# Patient Record
Sex: MALE | Race: WHITE
[De-identification: names, ages, dates, MRNs, and addresses within clinical notes are randomized per-mention and may not be internally consistent; named-entity substitution may affect disease eponyms.]

---

## 2022-05-31 LAB
BUN BLD-MCNC: 24 MG/DL (ref 7–18)
GLUCOSE SERPLBLD-MCNC: 88 MG/DL (ref 74–106)
HCT VFR BLD CALC: 49.1 % (ref 39.6–49)
LYMPHOCYTES # SPEC AUTO: 2.1 K/UL (ref 0.7–4.9)
PMV BLD: 7.7 FL (ref 7.6–11.3)
POTASSIUM SERPL-SCNC: 4 MMOL/L (ref 3.5–5.1)
RBC # BLD: 5.87 M/UL (ref 4.33–5.43)

## 2022-06-03 ENCOUNTER — HOSPITAL ENCOUNTER (OUTPATIENT)
Dept: HOSPITAL 97 - OR | Age: 52
Discharge: HOME | End: 2022-06-03
Attending: SURGERY
Payer: COMMERCIAL

## 2022-06-03 VITALS — SYSTOLIC BLOOD PRESSURE: 103 MMHG | OXYGEN SATURATION: 96 % | TEMPERATURE: 97.7 F | DIASTOLIC BLOOD PRESSURE: 50 MMHG

## 2022-06-03 DIAGNOSIS — Z20.822: ICD-10-CM

## 2022-06-03 DIAGNOSIS — K42.0: Primary | ICD-10-CM

## 2022-06-03 PROCEDURE — 88302 TISSUE EXAM BY PATHOLOGIST: CPT

## 2022-06-03 PROCEDURE — 49653: CPT

## 2022-06-03 PROCEDURE — 85025 COMPLETE CBC W/AUTO DIFF WBC: CPT

## 2022-06-03 PROCEDURE — 36415 COLL VENOUS BLD VENIPUNCTURE: CPT

## 2022-06-03 PROCEDURE — 80048 BASIC METABOLIC PNL TOTAL CA: CPT

## 2022-06-03 PROCEDURE — 0WUF4JZ SUPPLEMENT ABDOMINAL WALL WITH SYNTHETIC SUBSTITUTE, PERCUTANEOUS ENDOSCOPIC APPROACH: ICD-10-PCS

## 2022-06-03 RX ADMIN — HYDROMORPHONE HYDROCHLORIDE ONE MG: 1 INJECTION, SOLUTION INTRAMUSCULAR; INTRAVENOUS; SUBCUTANEOUS at 10:16

## 2022-06-03 RX ADMIN — BUPIVACAINE HYDROCHLORIDE ONE ML: 2.5 INJECTION, SOLUTION EPIDURAL; INFILTRATION; INTRACAUDAL at 08:46

## 2022-06-03 RX ADMIN — HYDROMORPHONE HYDROCHLORIDE ONE MG: 1 INJECTION, SOLUTION INTRAMUSCULAR; INTRAVENOUS; SUBCUTANEOUS at 10:17

## 2022-06-03 RX ADMIN — HYDROMORPHONE HYDROCHLORIDE ONE MG: 1 INJECTION, SOLUTION INTRAMUSCULAR; INTRAVENOUS; SUBCUTANEOUS at 10:08

## 2022-06-03 RX ADMIN — BUPIVACAINE HYDROCHLORIDE ONE ML: 2.5 INJECTION, SOLUTION EPIDURAL; INFILTRATION; INTRACAUDAL at 08:37

## 2022-06-03 RX ADMIN — HYDROMORPHONE HYDROCHLORIDE ONE MG: 1 INJECTION, SOLUTION INTRAMUSCULAR; INTRAVENOUS; SUBCUTANEOUS at 10:00

## 2022-06-03 RX ADMIN — HYDROMORPHONE HYDROCHLORIDE ONE MG: 1 INJECTION, SOLUTION INTRAMUSCULAR; INTRAVENOUS; SUBCUTANEOUS at 09:40

## 2022-06-03 RX ADMIN — HYDROMORPHONE HYDROCHLORIDE ONE MG: 1 INJECTION, SOLUTION INTRAMUSCULAR; INTRAVENOUS; SUBCUTANEOUS at 09:50

## 2022-06-03 RX ADMIN — HYDROMORPHONE HYDROCHLORIDE ONE MG: 1 INJECTION, SOLUTION INTRAMUSCULAR; INTRAVENOUS; SUBCUTANEOUS at 09:45

## 2022-06-03 NOTE — OP
Date of Procedure:  06/03/2022



Surgeon:  Sinan Acuña MD, MD



Preoperative Diagnosis:  Incarcerated ventral umbilical hernia.



Postoperative Diagnosis:  Incarcerated ventral umbilical hernia.



Procedure Performed:  Laparoscopic ventral umbilical hernia repair with mesh.



Anesthesia:  General endotracheal plus local with 0.25% Marcaine.



Estimated Blood Loss:  Less than 5 cc.



Specimens:  Hernia contents.



Findings:  Incarcerated umbilical hernia with skin changes.



Complications:  None.



Implants:  Approximately 11.4 cm Bard Ventralight round mesh and the SorbaFix absorbable fixation tac
ker.



Disposition:  Patient was transferred to recovery room in good condition.



Procedure In Detail:  After informed consent was obtained, patient was brought to the operating room 
and prepped and draped in the usual sterile fashion.  After adequate anesthesia was achieved, an area
 of the left upper quadrant was anesthetized with 0.25% Marcaine and sharply incised.  A 5 mm 0-degre
e optical trocar was introduced in the abdomen without evidence of any complication.  Insufflation wa
s obtained to 15 mmHg at this time.  There was no injury to vital structures upon entry into the abdo
men.  Additional trocar was chosen at left lower quadrant.  This area was similarly anesthetized and 
sharply incised.  A 12 mm trocar was placed under direct visualization without any evidence of compli
cation.  I then used a ratcheted graspers to grasp patient's incarcerated umbilical hernia and reduce
d it to the normal peritoneal position.  Omentum was entrapped within as well as preperitoneal fat.  
After using the LigaSure device to clear back the preperitoneal fat to allow for appropriate landing 
zone, additional hernia was appreciated.  The infraumbilical position was approximately half a centim
eter in size inferior to the umbilical hernia, which was found to be approximately 2 cm defect.  At t
his point, I used the Endo stitch with a V-Loc suture and ran a baseball stitch to close the hernia d
efect while imbricating the hernia sac in a running fashion as described with good apposition of the 
tissues.  I then sized an 11.4 cm Bard Ventralight ST mesh with Echo Positioning System positioned at
 the central portion with a stab incision and deployed the balloon at this point after being complete
ly positioned.  At this point, I used the SorbaFix absorbable fixation Tacker to secure the mesh to t
he anterior abdominal wall with a single crown, removed the mesh deployment system and then secured a
 second crown of approximately 45 to 50 tacks to the anterior abdominal wall with good apposition of 
tissues.  At this point, the mesh was in good apposition.  The hernia was completely repaired.  No bl
eeding or hemostatic maneuvers were required.  The patient was positioned slightly away.  The 12 mm t
rocar site was closed using a Carson-Wade suture passer with 0 Vicryl in an interrupted fashion w
ith good approximation of tissues.  The abdomen was completely desufflated under direct visualization
 without any complication.  Remaining skin incisions were then copiously irrigated after the abdomen 
was completely desufflated.  All skin incisions were then irrigated, closed with a 4-0 Monocryl in a 
running fashion.  Dermabond was placed over top.  The patient tolerated the procedure well without an
y evidence of complication and transferred to PACU in good condition.  All counts were correct at the
 end of the case.





MARIELLA/TIFFANY

DD:  06/03/2022 09:34:19Voice ID:  [id number]

DT:  06/03/2022 20:42:42Report ID:  [id number]

## 2022-06-03 NOTE — P.OP
Preoperative diagnosis: Incarcerated Ventral Umbilical Hernia


Postoperative diagnosis: Incarcerated Ventral Umbilical Hernia


Primary procedure: Laparoscopic Ventral Hernia Repair with Mesh


Anesthesia: GETA + Local


Estimated blood loss: <5cc


Specimen: hernia contents


Findings: incarcerated umbilical hernia with skin changes


Complications: None


Implants: 11.4cm bard ventralite round mesh, sorbafix x 2


Transferred to: Recovery Room


Condition: Good

## 2022-07-11 ENCOUNTER — HOSPITAL ENCOUNTER (EMERGENCY)
Dept: HOSPITAL 97 - ER | Age: 52
Discharge: HOME | End: 2022-07-11
Payer: COMMERCIAL

## 2022-07-11 VITALS — OXYGEN SATURATION: 99 % | DIASTOLIC BLOOD PRESSURE: 78 MMHG | TEMPERATURE: 98 F | SYSTOLIC BLOOD PRESSURE: 126 MMHG

## 2022-07-11 DIAGNOSIS — R19.7: ICD-10-CM

## 2022-07-11 DIAGNOSIS — R11.2: ICD-10-CM

## 2022-07-11 DIAGNOSIS — R10.9: Primary | ICD-10-CM

## 2022-07-11 LAB
ALBUMIN SERPL BCP-MCNC: 3.7 G/DL (ref 3.4–5)
ALP SERPL-CCNC: 86 U/L (ref 45–117)
ALT SERPL W P-5'-P-CCNC: 67 U/L (ref 12–78)
AST SERPL W P-5'-P-CCNC: 25 U/L (ref 15–37)
BUN BLD-MCNC: 23 MG/DL (ref 7–18)
GLUCOSE SERPLBLD-MCNC: 91 MG/DL (ref 74–106)
HCT VFR BLD CALC: 48.5 % (ref 39.6–49)
LIPASE SERPL-CCNC: 84 U/L (ref 73–393)
LYMPHOCYTES # SPEC AUTO: 1.7 K/UL (ref 0.7–4.9)
MCV RBC: 81.7 FL (ref 80–100)
PMV BLD: 7.8 FL (ref 7.6–11.3)
POTASSIUM SERPL-SCNC: 3.9 MMOL/L (ref 3.5–5.1)
RBC # BLD: 5.94 M/UL (ref 4.33–5.43)

## 2022-07-11 PROCEDURE — 36415 COLL VENOUS BLD VENIPUNCTURE: CPT

## 2022-07-11 PROCEDURE — 74177 CT ABD & PELVIS W/CONTRAST: CPT

## 2022-07-11 PROCEDURE — 96360 HYDRATION IV INFUSION INIT: CPT

## 2022-07-11 PROCEDURE — 99284 EMERGENCY DEPT VISIT MOD MDM: CPT

## 2022-07-11 PROCEDURE — 81003 URINALYSIS AUTO W/O SCOPE: CPT

## 2022-07-11 PROCEDURE — 85025 COMPLETE CBC W/AUTO DIFF WBC: CPT

## 2022-07-11 PROCEDURE — 83690 ASSAY OF LIPASE: CPT

## 2022-07-11 PROCEDURE — 80053 COMPREHEN METABOLIC PANEL: CPT

## 2022-07-11 NOTE — EDPHYS
Physician Documentation                                                                           

 [facility]                                                                 

Name: Ángel Zimmer                                                                         

Age: 51 yrs                                                                                       

Sex: Male                                                                                         

: 1970                                                                                   

MRN: [id number]                                                                                   

Arrival Date: 2022                                                                          

Time: 10:41                                                                                       

Account#: [id number]                                                                            

Bed 20                                                                                            

Private MD: Yevgeniy Nath                                                                         

ED Physician Anuj Cespedes                                                                         

HPI:                                                                                              

                                                                                             

11:21 This 51 yrs old Male presents to ER via Ambulatory with complaints of Abdominal Pain,   jmm 

      Vomiting/Diarrhea.                                                                          

11:21 The patient presents with abdominal pain. Onset: The symptoms/episode began/occurred    jmm 

      acutely, 1 day(s) ago. The symptoms do not radiate. Associated signs and symptoms:          

      Pertinent positives: nausea and vomiting, diarrhea. The symptoms are described as achy,     

      crampy. Modifying factors: The symptoms are alleviated by nothing, the symptoms are         

      aggravated by nothing. It is unknown whether or not the patient has had similar             

      symptoms in the past.                                                                       

                                                                                                  

Historical:                                                                                       

- Allergies:                                                                                      

10:57 No Known Allergies;                                                                     ap3 

- Home Meds:                                                                                      

10:57 Flomax Oral [Active]; Nexium Oral [Active];                                             ap3 

- PMHx:                                                                                           

10:57 enlarged prostate;                                                                      ap3 

                                                                                                  

- Immunization history:: Client reports receiving the 2nd dose of the Covid vaccine.              

- Social history:: Smoking status: Patient denies any tobacco usage or history of.                

                                                                                                  

                                                                                                  

ROS:                                                                                              

11:21 Cardiovascular: Negative for chest pain, palpitations, and edema, Respiratory: Negative jmm 

      for shortness of breath, cough, wheezing, and pleuritic chest pain.                         

11:21 Constitutional: Positive for body aches.                                                    

11:21 Abdomen/GI: Positive for abdominal pain, nausea and vomiting, diarrhea.                     

11:21 All other systems are negative.                                                             

                                                                                                  

Exam:                                                                                             

11:21 Head/Face:  atraumatic. Eyes:  EOMI, no conjunctival erythema appreciated ENT:  Moist   jmm 

      Mucus Membranes Neck:  Trachea midline, Supple Chest/axilla:  Normal chest wall             

      appearance and motion.   Cardiovascular:  Regular rate and rhythm.  No edema                

      appreciated Respiratory:  Normal respirations, no respiratory distress appreciated          

11:21 Back:  Normal ROM Skin:  General appearance color normal MS/ Extremity:  Moves all          

      extremities, no obvious deformities appreciated, no edema noted to the lower                

      extremities  Neuro:  Awake and alert Psych:  Behavior is normal, Mood is normal,            

      Patient is cooperative and pleasant                                                         

11:21 Constitutional: The patient appears in no acute distress, alert, awake.                     

11:21 Abdomen/GI: Inspection: abdomen appears normal, Bowel sounds: normal, Palpation: soft,      

      mild abdominal tenderness, in all quadrants.                                                

                                                                                                  

Vital Signs:                                                                                      

10:55  / 89; Pulse 92; Resp 17; Temp 98.1; Pulse Ox 96% ; Weight 93.44 kg; Height 6 ft. ap3 

      0 in. (182.88 cm);                                                                          

15:07  / 78; Pulse 86; Resp 18; Temp 98.0; Pulse Ox 99% on R/A;                         ph  

10:55 Body Mass Index 27.94 (93.44 kg, 182.88 cm)                                             ap3 

                                                                                                  

MDM:                                                                                              

11:21 Patient medically screened.                                                             [facility] 

15:57 Data reviewed: vital signs, nurses notes. Counseling: I had a detailed discussion with  [facility] 

      the patient and/or guardian regarding: the historical points, exam findings, and any        

      diagnostic results supporting the discharge/admit diagnosis, lab results, radiology         

      results, the need for outpatient follow up, to return to the emergency department if        

      symptoms worsen or persist or if there are any questions or concerns that arise at home.    

                                                                                                  

                                                                                             

11:21 Order name: CBC with Diff; Complete Time: 12:20                                         [facility] 

                                                                                             

11:21 Order name: CMP; Complete Time: 12:32                                                   [facility] 

                                                                                             

11:21 Order name: Lipase; Complete Time: 12:32                                                [facility] 

                                                                                             

11:22 Order name: CT Abd/Pelvis - IV Contrast Only; Complete Time: 13:31                      [facility] 

                                                                                             

14:25 Order name: Urine Dipstick-Ancillary; Complete Time: 14:26                              [facility]

                                                                                             

11:21 Order name: IV Saline Lock; Complete Time: 12:14                                        [facility] 

                                                                                             

11:21 Order name: Labs collected and sent; Complete Time: 12:14                               [facility] 

                                                                                             

11:21 Order name: Urine Dipstick-Ancillary (obtain specimen); Complete Time: 14:26            [facility] 

                                                                                                  

Administered Medications:                                                                         

15:07 Drug: Lactated Ringers Solution 1000 ml Route: IV; Rate: 1000 bolus; Site: right        ph  

      antecubital;                                                                                

16:35 Follow up: Response: No adverse reaction; IV Status: Completed infusion; IV Intake:     ph  

      1000ml                                                                                      

                                                                                                  

                                                                                                  

Disposition:                                                                                      

18:22 Co-signature as Attending Physician, Anuj Cespedes MD.                                    rn  

                                                                                                  

Disposition Summary:                                                                              

22 15:59                                                                                    

Discharge Ordered                                                                                 

      Location: Home                                                                          jmm 

      Condition: Stable                                                                       jmm 

      Diagnosis                                                                                   

        - Abdominal pain, unspecified                                                         jmm 

        - Vomiting                                                                            jmm 

        - Diarrhea, unspecified                                                               jmm 

      Followup:                                                                               jmm 

        - With: Sinan Acuña MD                                                                   

        - When: 2 - 3 days                                                                         

        - Reason: Recheck today's complaints, Continuance of care, Re-evaluation by your           

      physician                                                                                   

      Forms:                                                                                      

        - Medication Reconciliation Form                                                      jmm 

        - Thank You Letter                                                                    jmm 

        - Antibiotic Education                                                                jmm 

        - Prescription Opioid Use                                                             jmm 

Signatures:                                                                                       

Dispatcher [employer]                           EDMS                                                 

Juan A Russo PA                       PA   jmm                                                  

Anuj Cespedes MD MD rn Hall, Patricia, RN                      RN                                                      

Fozia Bella RN                    RN   ap3                                                  

                                                                                                  

Corrections: (The following items were deleted from the chart)                                    

10:58 10:57 Allergies: Aspirin; ap3                                                           ap3 

                                                                                                  

**************************************************************************************************
bilateral upper extremities/bilateral lower extremities/normal

## 2022-07-11 NOTE — ER
Nurse's Notes                                                                                     

 [facility]                                                                 

Name: Ángel Zimmer                                                                         

Age: 51 yrs                                                                                       

Sex: Male                                                                                         

: 1970                                                                                   

MRN: [id number]                                                                                   

Arrival Date: 2022                                                                          

Time: 10:41                                                                                       

Account#: [id number]                                                                            

Bed 20                                                                                            

Private MD: Yevgeniy Nath                                                                         

Diagnosis: Abdominal pain, unspecified;Vomiting;Diarrhea, unspecified                             

                                                                                                  

Presentation:                                                                                     

                                                                                             

10:55 Chief complaint: Patient states: He has had NVD along with possible fever for approx    ap3 

      one day now. Patient also states having left sided abdominal pain that began yesterday      

      as well. Patient reports having a hernia repair surgery on 6/3/22 and didn't know if        

      his symptoms were related to that surgery or not. Coronavirus screen: Client presents       

      with at least one sign or symptom that may indicate coronavirus-19. Ebola Screen: No        

      symptoms or risks identified at this time. Initial Sepsis Screen: Does the patient meet     

      any 2 criteria? No. Patient's initial sepsis screen is negative. Does the patient have      

      a suspected source of infection? No. Patient's initial sepsis screen is negative. Risk      

      Assessment: Do you want to hurt yourself or someone else? Patient reports no desire to      

      harm self or others. Onset of symptoms was July 10, 2022.                                   

10:55 Method Of Arrival: Ambulatory                                                           ap3 

10:59 Acuity: DARNELL 3                                                                           ap3 

                                                                                                  

Triage Assessment:                                                                                

10:58 General: Appears in no apparent distress. Behavior is calm, cooperative, appropriate    ap3 

      for age. Pain: Complains of pain in abdomen. Neuro: Level of Consciousness is awake,        

      alert, obeys commands, Oriented to person, place, time, situation, Moves all                

      extremities. Gait is steady. Cardiovascular: Patient's skin is warm and dry.                

      Respiratory: Airway is patent Respiratory effort is even, unlabored. GI: Reports            

      cramping, diarrhea, intolerance of fluids, intolerance of food, nausea, vomiting.           

                                                                                                  

Historical:                                                                                       

- Allergies:                                                                                      

10:57 No Known Allergies;                                                                     ap3 

- Home Meds:                                                                                      

10:57 Flomax Oral [Active]; Nexium Oral [Active];                                             ap3 

- PMHx:                                                                                           

10:57 enlarged prostate;                                                                      ap3 

                                                                                                  

- Immunization history:: Client reports receiving the 2nd dose of the Covid vaccine.              

- Social history:: Smoking status: Patient denies any tobacco usage or history of.                

                                                                                                  

                                                                                                  

Screening:                                                                                        

10:59 Abuse screen: Denies threats or abuse. Nutritional screening: No deficits noted.        ap3 

      Tuberculosis screening: No symptoms or risk factors identified.                             

15:08 Fall Risk None identified.                                                              ph  

                                                                                                  

Assessment:                                                                                       

15:07 General: Appears in no apparent distress. comfortable, well groomed, Behavior is calm,  ph  

      cooperative, appropriate for age. Pain: Complains of pain in abdomen. Neuro: Level of       

      Consciousness is awake, alert, obeys commands, Oriented to person, place, time,             

      situation. Cardiovascular: Capillary refill < 3 seconds in bilateral fingers Patient's      

      skin is warm and dry. Respiratory: Airway is patent Respiratory effort is even,             

      unlabored. GI: Abdomen is non-distended, Bowel sounds present X 4 quads. Abd is soft        

      and non tender X 4 quads. Reports lower abdominal pain, diarrhea, nausea, vomiting.         

      Derm: Skin is intact, is healthy with good turgor, Skin is pink, warm \T\ dry.              

                                                                                                  

Vital Signs:                                                                                      

10:55  / 89; Pulse 92; Resp 17; Temp 98.1; Pulse Ox 96% ; Weight 93.44 kg; Height 6 ft. ap3 

      0 in. (182.88 cm);                                                                          

15:07  / 78; Pulse 86; Resp 18; Temp 98.0; Pulse Ox 99% on R/A;                         ph  

10:55 Body Mass Index 27.94 (93.44 kg, 182.88 cm)                                             ap3 

                                                                                                  

ED Course:                                                                                        

10:41 Patient arrived in ED.                                                                  mr  

10:41 Yevgeniy Nath MD is Private Physician.                                                 mr  

10:46 [facility]                                                              m 

10:46 Anuj Cespedes MD is Attending Physician.                                                [facility] 

10:59 Triage completed.                                                                       ap3 

10:59 Arm band placed on right wrist.                                                         ap3 

12:13 Inserted saline lock: 20 gauge in right antecubital area, using aseptic technique.      zm  

      Blood collected.                                                                            

12:14 CBC with Diff Sent.                                                                     zm  

12:14 CMP Sent.                                                                               zm  

12:14 Lipase Sent.                                                                            zm  

12:56 CT Abd/Pelvis - IV Contrast Only In Process Unspecified.                                EDMS

14:41 Sarahi Randle, RN is Primary Nurse.                                                    ph  

15:08 Patient has correct armband on for positive identification. Bed in low position. Call   ph  

      light in reach. Side rails up X 1. Pulse ox on. NIBP on.                                    

15:08 No provider procedures requiring assistance completed.                                  ph  

15:58 Sinan Acuña MD is Referral Physician.                                                 [facility] 

16:36 IV discontinued, intact, bleeding controlled, No redness/swelling at site. Pressure     ph  

      dressing applied.                                                                           

                                                                                                  

Administered Medications:                                                                         

15:07 Drug: Lactated Ringers Solution 1000 ml Route: IV; Rate: 1000 bolus; Site: right        ph  

      antecubital;                                                                                

16:35 Follow up: Response: No adverse reaction; IV Status: Completed infusion; IV Intake:     ph  

      1000ml                                                                                      

                                                                                                  

                                                                                                  

Medication:                                                                                       

15:08 VIS not applicable for this client.                                                     ph  

                                                                                                  

Intake:                                                                                           

16:35 IV: 1000ml; Total: 1000ml.                                                              ph  

                                                                                                  

Outcome:                                                                                          

15:59 Discharge ordered by MD.                                                                [facility] 

16:35 Discharged to home ambulatory, with significant other.                                  ph  

16:35 Condition: good                                                                             

16:35 Discharge instructions given to patient, Instructed on discharge instructions, follow       

      up and referral plans. Demonstrated understanding of instructions, follow-up care.          

16:36 Patient left the ED.                                                                    ph  

                                                                                                  

Signatures:                                                                                       

Dispatcher [employer]                           EDMS                                                 

Juan A Russo PA PA   [facility]                                                  

Mary Vizcarra                                                   

Sarahi Randle RN                      RN                                                      

Fozia Bella RN                    RN   ap3                                                  

Ana Barbosa                                                   

                                                                                                  

Corrections: (The following items were deleted from the chart)                                    

10:58 10:57 Allergies: Aspirin; ap3                                                           ap3 

                                                                                                  

**************************************************************************************************

## 2022-07-11 NOTE — RAD REPORT
EXAM DESCRIPTION:  CT - Abdomen   Pelvis W Contrast - 7/11/2022 12:55 pm

 

CLINICAL HISTORY:  abdominal pain, vomiting

 

COMPARISON:  No comparisons

 

TECHNIQUE:  Biphasic, helical CT imaging of the abdomen and pelvis was performed following 100 ml non
-ionic IV contrast.

 

No oral contrast administered.

 

All CT scans are performed using dose optimization technique as appropriate and may include automated
 exposure control or mA/KV adjustment according to patient size.

 

FINDINGS:  No suspicious findings in the lung bases.

 

The liver, spleen, and pancreas show no suspicious findings. Gallbladder and biliary tree are also wi
thout suspicious finding.

 

Symmetric renal function is seen with no hydronephrosis or suspicious renal mass. An exophytic 2.5 cm
 mass projects from the lateral aspect of the left kidney. No pyelonephritis or acute parenchymal pro
cess. No bladder abnormalities. No adrenal abnormalities.

 

Fluid is present in nondilated stomach. No gastric wall thickening or mass. Duodenum is unremarkable.
 There are multiple loops of jejunum and proximal ileum that are prominent and fluid-filled. This is 
an enteritis pattern without focal mass or obstruction. Fluid is present in the right-side of the col
on to the mid transverse colon level. No colon wall thickening or mass. Left-side of the colon is dec
ompressed.

 

 No free air, free fluid or inflammatory stranding.  No mass or bulky lymphadenopathy. Patient has mo
derate-sized bilateral fat filled inguinal hernias. There is a very small 15 mm umbilical hernia.

 

No suspicious bony findings.  Significant degenerative disc disease at L4-5 with disc bulge and endpl
ate spurring.

 

 

IMPRESSION:  Moderately prominent small bowel enteritis pattern is seen with no obstruction, free air
 or emergent finding.

 

Moderate-sized bilateral fat filled inguinal hernias are present. There is a small fat only umbilical
 hernia 15 mm in size.

## 2022-08-04 ENCOUNTER — HOSPITAL ENCOUNTER (OUTPATIENT)
Dept: HOSPITAL 97 - OR | Age: 52
Discharge: HOME | End: 2022-08-04
Attending: SURGERY
Payer: COMMERCIAL

## 2022-08-04 VITALS — TEMPERATURE: 97.3 F

## 2022-08-04 VITALS — DIASTOLIC BLOOD PRESSURE: 76 MMHG | OXYGEN SATURATION: 96 % | SYSTOLIC BLOOD PRESSURE: 92 MMHG

## 2022-08-04 DIAGNOSIS — Z20.822: ICD-10-CM

## 2022-08-04 DIAGNOSIS — K40.20: Primary | ICD-10-CM

## 2022-08-04 PROCEDURE — 36415 COLL VENOUS BLD VENIPUNCTURE: CPT

## 2022-08-04 PROCEDURE — 88302 TISSUE EXAM BY PATHOLOGIST: CPT

## 2022-08-04 PROCEDURE — 87811 SARS-COV-2 COVID19 W/OPTIC: CPT

## 2022-08-04 PROCEDURE — 0YUA0JZ SUPPLEMENT BILATERAL INGUINAL REGION WITH SYNTHETIC SUBSTITUTE, OPEN APPROACH: ICD-10-PCS

## 2022-08-04 RX ADMIN — HYDROMORPHONE HYDROCHLORIDE ONE MG: 1 INJECTION, SOLUTION INTRAMUSCULAR; INTRAVENOUS; SUBCUTANEOUS at 10:49

## 2022-08-04 RX ADMIN — MIDAZOLAM ONE MG: 1 INJECTION INTRAMUSCULAR; INTRAVENOUS at 11:06

## 2022-08-04 RX ADMIN — HYDROMORPHONE HYDROCHLORIDE ONE MG: 1 INJECTION, SOLUTION INTRAMUSCULAR; INTRAVENOUS; SUBCUTANEOUS at 11:16

## 2022-08-04 RX ADMIN — MIDAZOLAM ONE MG: 1 INJECTION INTRAMUSCULAR; INTRAVENOUS at 11:01

## 2022-08-04 RX ADMIN — HYDROMORPHONE HYDROCHLORIDE ONE MG: 1 INJECTION, SOLUTION INTRAMUSCULAR; INTRAVENOUS; SUBCUTANEOUS at 11:03

## 2022-08-04 RX ADMIN — HYDROMORPHONE HYDROCHLORIDE ONE MG: 1 INJECTION, SOLUTION INTRAMUSCULAR; INTRAVENOUS; SUBCUTANEOUS at 10:57

## 2022-08-04 RX ADMIN — HYDROMORPHONE HYDROCHLORIDE ONE MG: 1 INJECTION, SOLUTION INTRAMUSCULAR; INTRAVENOUS; SUBCUTANEOUS at 11:08

## 2022-08-04 RX ADMIN — HYDROMORPHONE HYDROCHLORIDE ONE MG: 1 INJECTION, SOLUTION INTRAMUSCULAR; INTRAVENOUS; SUBCUTANEOUS at 10:52

## 2022-08-04 NOTE — OP
Date of Procedure:  08/04/2022



Surgeon:  Sinan Acuña MD, MD



Preoperative Diagnosis:  Bilateral inguinal hernias.



Postoperative Diagnosis:  Bilateral inguinal hernias.



Procedure Performed:  Open bilateral inguinal hernia repair with mesh.



Anesthesia:  General endotracheal plus local with 0.25% Marcaine with epinephrine.



Estimated Blood Loss:  Less than 5 mL.



Specimens:  Cord lipoma from the right.



Findings:  

1.Incarcerated bilateral indirect inguinal hernias.

2.Bilateral cord lipomas.

3.External oblique aponeurosis was thin.

4.Significant scar tissue evident in bilateral Camper fat and Arpita fascia planes, extending up to 
the external oblique aponeurosis.



Complications:  None. 



The patient was transferred to recovery room in good condition.



Procedure In Detail:  After informed consent was obtained, the patient was brought to the operating r
oom, prepped and draped in the usual sterile fashion.  After adequate anesthesia was achieved, I init
iated palpation of the pubic tubercle and the anatomic landmarks in the left inguinal area.  I then m
arked the area on bilateral sides at this point, but I initiated with the left inguinal incision firs
t after appropriately anesthetizing the skin.  I used the 15 blade to dissect down through subcutaneo
us tissues.  I then dissected down through Camper fat and Arpita fascia, which were found to be quite
 scarred interestingly, as the patient had no previous surgery in this area.  There was a significant
 amount of adhesions extending up to the external oblique aponeurosis.  The external oblique aponeuro
sis was identified, sharply incised with a 15 blade and then opened in its entirety using Beth scisso
rs.  It was found to be quite thin with the spermatic cord structures evident through the structure. 
 The ilioinguinal and iliohypogastric nerves were protected throughout the procedure.  I then circumf
erentially dissected the spermatic cord and structures, and placed a Penrose drain under the spermati
c cord and structures, and used this to help dissect a cord lipoma off the spermatic cord and structu
res, which was able to be dissected and re-placed back to the preperitoneal space.  I dissected the h
ernia sac circumferentially around and imbricated it into the preperitoneal space.  At this point, we
 found a medium hernia plug to be at appropriate size and placed it into the preperitoneal space to r
epair the indirect inguinal hernia.  At this point, I then secured the plug using 2-0 PDS sutures cir
cumferentially around the defect.  I then irrigated the area copiously and sized a hernia patch appro
priately.  I then placed the patch on the pubic tubercle and secured it to the pubic tubercle using t
he same said 2-0 PDS suture.  After trimming the mesh appropriately, reconstituting and forming the d
eep inguinal ring, I then secured it to the medial and lateral shelving edges of the internal oblique
 aponeurosis as well as the undersurface of the inguinal ligament.  At this point, the area was copio
usly irrigated once again.  The deep inguinal ring was reconstituted and reformed, and I trimmed a sl
ightly bit more of the mesh at this point to make an appropriate closure.  I then closed the external
 oblique aponeurosis over the top after irrigating the area using a 3-0 Vicryl suture in a running fa
shion.  I then closed Camper fat and Arpita fascia using 3-0 Vicryl suture in a running fashion.  Ludmila
p dermal plane was closed also with 3-0 Vicryl suture and the skin was closed with a 4-0 Monocryl in 
a running fashion.  Dermabond was placed over the top.  I then initiated with the right inguinal inci
sonu after appropriately anesthetizing the skin.  I used the 15 blade to dissect down through subcuta
neous tissues.  I then dissected down through Camper fat and Arpita fascia, which were found to be qu
ite scarred interestingly, as the patient had no previous surgery in this area.  There was a signific
ant amount of adhesions extending up to the external oblique aponeurosis.  The external oblique apone
urosis was identified, sharply incised with a 15 blade and then opened in its entirety using Beth sci
ssors.  It was found to be quite thin with the spermatic cord structures evident through the structur
e.  The ilioinguinal and iliohypogastric nerves were protected throughout the procedure.  I then circ
umferentially dissected the spermatic cord and structures, and placed a Penrose drain under the sperm
atic cord and structures, and used this to help dissect a cord lipoma off the spermatic cord and stru
ctures, which was able to be dissected and re-placed back to the preperitoneal space.  I dissected th
e hernia sac circumferentially around and imbricated it into the preperitoneal space.  At this point,
 we found a small hernia plug to be at appropriate size and placed it into the preperitoneal space to
 repair the indirect inguinal hernia.  At this point, I then secured the plug using 2-0 PDS sutures c
ircumferentially around the defect.  I then irrigated the area copiously and sized a hernia patch corinna
ropriately.  I then placed the patch on the pubic tubercle and secured it to the pubic tubercle using
 the same said 2-0 PDS suture.  After trimming the mesh appropriately, reconstituting and forming the
 deep inguinal ring, I then secured it to the medial and lateral shelving edges of the internal obliq
ue aponeurosis as well as the undersurface of the inguinal ligament.  At this point, the area was 
iously irrigated once again.  The deep inguinal ring was reconstituted and reformed, and I trimmed a 
slightly bit more of the mesh at this point to make an appropriate closure.  I then closed the extern
al oblique aponeurosis over the top after irrigating the area using a 3-0 Vicryl suture in a running 
fashion.  I then closed Camper fat and Arpita fascia using 3-0 Vicryl suture in a running fashion.  D
eep dermal plane was closed also with 3-0 Vicryl suture and the skin was closed with a 4-0 Monocryl i
n a running fashion.  Dermabond was placed over the top.  The patient tolerated procedure well withou
t evidence of complication, transferred to PACU in good condition.  All counts were correct at the en
d of the case.





MARIELLA/TIFFANY

DD:  08/04/2022 10:29:45Voice ID:  [id number]

DT:  08/04/2022 22:39:36Report ID:  [id number]

## 2022-08-04 NOTE — P.OP
Assistant: Rosalinda Álvarez


Preoperative diagnosis: Bilateral Inguinal Hernias 


Postoperative diagnosis: Bilateral Inguinal Hernias 


Primary procedure: Open Bilateral Inguinal Hernia Repair with mesh


Anesthesia: GETA + Local


Estimated blood loss: <5cc


Specimen: cord lipoma from RIGHT


Findings: incarcerated bilat, ing hernias, ext oblique thin, adhesions


Complications: None


Transferred to: Recovery Room


Condition: Good

## 2024-11-21 LAB
ANION GAP SERPL CALC-SCNC: 4.6 MEQ/L (ref 5–15)
BUN BLD-MCNC: 21 MG/DL (ref 7–18)
GLUCOSE SERPLBLD-MCNC: 105 MG/DL (ref 74–106)
HCT VFR BLD CALC: 49 % (ref 39.6–49)
HGB BLD-MCNC: 16.1 G/DL (ref 13.6–17.9)
LYMPHOCYTES # SPEC AUTO: 1.7 K/UL (ref 0.7–4.9)
MCH RBC QN AUTO: 28.3 PG (ref 27–35)
MCHC RBC AUTO-ENTMCNC: 32.9 G/DL (ref 32–36)
MCV RBC: 86 FL (ref 80–100)
NRBC # BLD: 0 10*3/UL (ref 0–0)
NRBC BLD AUTO-RTO: 0 % (ref 0–0)
PMV BLD: 7.8 FL (ref 7.6–11.3)
POTASSIUM SERPL-SCNC: 3.6 MEQ/L (ref 3.5–5.1)
RBC # BLD: 5.7 M/UL (ref 4.33–5.43)
WBC # BLD AUTO: 6.4 THOU/UL (ref 4.3–10.9)

## 2024-11-22 ENCOUNTER — HOSPITAL ENCOUNTER (OUTPATIENT)
Dept: HOSPITAL 97 - OR | Age: 54
Discharge: HOME | End: 2024-11-22
Attending: SURGERY
Payer: COMMERCIAL

## 2024-11-22 VITALS — DIASTOLIC BLOOD PRESSURE: 60 MMHG | OXYGEN SATURATION: 97 % | TEMPERATURE: 97.7 F | SYSTOLIC BLOOD PRESSURE: 134 MMHG

## 2024-11-22 DIAGNOSIS — F41.9: ICD-10-CM

## 2024-11-22 DIAGNOSIS — L72.0: Primary | ICD-10-CM

## 2024-11-22 PROCEDURE — 11402 EXC TR-EXT B9+MARG 1.1-2 CM: CPT

## 2024-11-22 PROCEDURE — 88304 TISSUE EXAM BY PATHOLOGIST: CPT

## 2024-11-22 PROCEDURE — 0JB70ZZ EXCISION OF BACK SUBCUTANEOUS TISSUE AND FASCIA, OPEN APPROACH: ICD-10-PCS

## 2024-11-22 PROCEDURE — 80048 BASIC METABOLIC PNL TOTAL CA: CPT

## 2024-11-22 PROCEDURE — 93005 ELECTROCARDIOGRAM TRACING: CPT

## 2024-11-22 PROCEDURE — 11404 EXC TR-EXT B9+MARG 3.1-4 CM: CPT

## 2024-11-22 PROCEDURE — 36415 COLL VENOUS BLD VENIPUNCTURE: CPT

## 2024-11-22 PROCEDURE — 85025 COMPLETE CBC W/AUTO DIFF WBC: CPT

## 2024-11-22 PROCEDURE — 11406 EXC TR-EXT B9+MARG >4.0 CM: CPT

## 2024-11-22 RX ADMIN — SODIUM CHLORIDE, SODIUM LACTATE, POTASSIUM CHLORIDE, AND CALCIUM CHLORIDE ONE MLS: .6; .31; .03; .02 INJECTION, SOLUTION INTRAVENOUS at 10:45

## 2024-11-22 RX ADMIN — CEFAZOLIN ONE GM: 2 INJECTION, POWDER, FOR SOLUTION INTRAMUSCULAR; INTRAVENOUS at 11:28

## 2024-11-22 RX ADMIN — LIDOCAINE HYDROCHLORIDE AND EPINEPHRINE ONE ML: 10; 10 INJECTION, SOLUTION INFILTRATION; PERINEURAL at 11:49
